# Patient Record
Sex: FEMALE | Race: WHITE | NOT HISPANIC OR LATINO | Employment: FULL TIME | URBAN - METROPOLITAN AREA
[De-identification: names, ages, dates, MRNs, and addresses within clinical notes are randomized per-mention and may not be internally consistent; named-entity substitution may affect disease eponyms.]

---

## 2017-08-10 ENCOUNTER — OFFICE VISIT (OUTPATIENT)
Dept: INTERNAL MEDICINE | Facility: MEDICAL CENTER | Age: 42
End: 2017-08-10
Payer: COMMERCIAL

## 2017-08-10 VITALS
BODY MASS INDEX: 35.82 KG/M2 | SYSTOLIC BLOOD PRESSURE: 119 MMHG | WEIGHT: 215 LBS | HEART RATE: 66 BPM | OXYGEN SATURATION: 95 % | DIASTOLIC BLOOD PRESSURE: 68 MMHG | HEIGHT: 65 IN | TEMPERATURE: 98.6 F

## 2017-08-10 DIAGNOSIS — E66.9 NON MORBID OBESITY, UNSPECIFIED OBESITY TYPE: ICD-10-CM

## 2017-08-10 DIAGNOSIS — R00.2 INTERMITTENT PALPITATIONS: ICD-10-CM

## 2017-08-10 DIAGNOSIS — F41.8 DEPRESSION WITH ANXIETY: ICD-10-CM

## 2017-08-10 DIAGNOSIS — E78.5 DYSLIPIDEMIA: ICD-10-CM

## 2017-08-10 DIAGNOSIS — R73.01 IMPAIRED FASTING GLUCOSE: ICD-10-CM

## 2017-08-10 DIAGNOSIS — E03.9 HYPOTHYROIDISM, UNSPECIFIED TYPE: ICD-10-CM

## 2017-08-10 PROCEDURE — 99214 OFFICE O/P EST MOD 30 MIN: CPT | Performed by: INTERNAL MEDICINE

## 2017-08-10 RX ORDER — LEVOTHYROXINE SODIUM 0.05 MG/1
50 TABLET ORAL
Qty: 30 TAB | Refills: 12 | Status: SHIPPED | OUTPATIENT
Start: 2017-08-10

## 2017-08-10 ASSESSMENT — PATIENT HEALTH QUESTIONNAIRE - PHQ9: CLINICAL INTERPRETATION OF PHQ2 SCORE: 0

## 2017-08-10 ASSESSMENT — PAIN SCALES - GENERAL: PAINLEVEL: NO PAIN

## 2017-08-10 NOTE — PROGRESS NOTES
"    Established Patient    Elsi Jasmine is a 42 y.o. female who presents today with the following:    CC: Follow-up for chronic medical problems.     HPI:  Feeling a bit more tired lately, cold and having some occasional episodes of waking up sweating.  She is also finding that she is falling asleep at work.  She is finding it is harder to get out of bed in the morning lately, due to fatigue upon waking.  + recent palpitations 2x in the last few months.  Recent treadmill test was negative.    1. Depression with anxiety  Mood has been stable.  She had one panic attack in the last 6 months (last week) and was able to control it without needing her lorazepam.     2. Dyslipidemia  Currently taking medications for dyslipidemia. Her most recent triglyceride level was 262, and HDL is 30. Fortunately, she has a low LDL at 74.    3. Non morbid obesity, unspecified obesity type  Weight has been stable.  Exercise is limited. She generally has a decent on a fatigue that gets in the way.    ROS:     Denies any new chest pain or shortness of breath.  No changes to urinary or bowel function. See HPI.    Past Medical History   Diagnosis Date   • Depression    • Anxiety    • Obesity (BMI 30.0-34.9)    • Hypothyroidism        Social History   Substance Use Topics   • Smoking status: Former Smoker     Quit date: 09/12/2013   • Smokeless tobacco: Never Used   • Alcohol Use: No       Current Outpatient Prescriptions   Medication Sig Dispense Refill   • paroxetine (PAXIL) 20 MG Tab Take 1 Tab by mouth every day. 30 Tab 12   • lorazepam (ATIVAN) 1 MG Tab Take 1 mg by mouth every four hours as needed for Anxiety.       No current facility-administered medications for this visit.       Physical Exam:  /68 mmHg  Pulse 66  Temp(Src) 37 °C (98.6 °F)  Ht 1.651 m (5' 5\")  Wt 97.523 kg (215 lb)  BMI 35.78 kg/m2  SpO2 95%  LMP 07/10/2017  Breastfeeding? No  General: Well developed, well nourished female, in no distress.  Eyes: " Conjuntiva without any obvious injection or erythema.   Cardiovascular: Heart is regular with no murmurs  Lungs: Clear to auscultation bilaterally. No wheezes, rhonci or crackles heard. Respiratory effort is normal.  Abd: Soft, non-tender  Ext: No edema  Neck: thyroid is non-palpable.    Assessment and Plan:  1. Depression with anxiety  Currently this is stable and well controlled.  The patient should continue current therapy with no changes at this time.   Very limited and rare use of lorazepam as needed just for panic attacks.    2. Dyslipidemia  We'll continue to monitor this. No indication for medication at this time. I'm hopeful that with treating her hypothyroidism, she'll be also get more exercise, lose weight, and have a positive impact on her lipid profile.    3. Non morbid obesity, unspecified obesity type  We'll continue to monitor weight. This could be somewhat related to low energy associated with hypothyroidism.  - Patient identified as having weight management issue.  Appropriate orders and counseling given.    4. Impaired fasting glucose  Most recent fasting blood sugar is 107. Hemoglobin A1c 5.2%. As above, weight loss will be important in controlling this.    5. Intermittent palpitations  EKG done in the office today showed normal sinus rhythm. No ischemic changes were noted. Patient has a cardiologist that she follows with. She was provided a copy of her EKG to take her cardiologist.  - EKG; Future    6. Hypothyroidism, unspecified type  Diagnosis of hypothyroidism today. TSH was noted to be elevated at 5.129, with a free T4 of 0.67. These values are consistent with what she's had over the last couple of years. Given this, along with the significant symptoms she is having, I think it's time to initiate therapy with levothyroxine. We'll start with a low-dose and recheck labs in 6 weeks.  - levothyroxine (SYNTHROID) 50 MCG Tab; Take 1 Tab by mouth Every morning on an empty stomach.  Dispense: 30  Tab; Refill: 12  - TSH; Future  - FREE THYROXINE; Future       Signed by: Perez Puckett M.D.    This note was created using voice recognition software.  While every attempt is made to ensure accuracy of transcription, occasionally errors occur.

## 2017-08-10 NOTE — MR AVS SNAPSHOT
"        Elsi Jasmine   8/10/2017 8:00 AM   Office Visit   MRN: [id number]    Department:  [facility]   Dept Phone:  [phone]    Description:  Female : 1975   Provider:  Perez Puckett M.D.           Reason for Visit     Follow-Up labs       Allergies as of 8/10/2017     No Known Allergies      You were diagnosed with     Depression with anxiety   [239448]       Dyslipidemia   [925044]       Non morbid obesity, unspecified obesity type   [3922600]       Impaired fasting glucose   [790.21.ICD-9-CM]       Intermittent palpitations   [8916929]       Hypothyroidism, unspecified type   [7527086]         Vital Signs     Blood Pressure Pulse Temperature Height Weight Body Mass Index    119/68 mmHg 66 37 °C (98.6 °F) 1.651 m (5' 5\") 97.523 kg (215 lb) 35.78 kg/m2    Oxygen Saturation Last Menstrual Period Breastfeeding? Smoking Status          95% 07/10/2017 No Former Smoker        Basic Information     Date Of Birth Sex Race Ethnicity Preferred Language    1975 Female White Non- English      Problem List              ICD-10-CM Priority Class Noted - Resolved    Non morbid obesity E66.9   2016 - Present    Depression with anxiety F41.8   2016 - Present    Dyslipidemia E78.5   2016 - Present    Impaired fasting glucose R73.01   8/10/2017 - Present    Hypothyroid E03.9   8/10/2017 - Present      Health Maintenance        Date Due Completion Dates    IMM DTaP/Tdap/Td Vaccine (6 - Tdap) 3/2/1987 1981, 1977, 1975, 1975, 1975    PAP SMEAR 3/2/1997 ---    IMM INFLUENZA (1) 2017    MAMMOGRAM 2017 (Postponed)    Override on 2016: Postponed (Pt due to see gyn soon and will have this done through them)            Current Immunizations     Dtap Vaccine 1981, 1977, 1975, 1975, 1975    Influenza Vaccine Quad Inj (Preserved) 2016    MMR Vaccine 1997, 1991    OPV - Historical Data 1981, " 1976, 1976, 1976      Below and/or attached are the medications your provider expects you to take. Review all of your home medications and newly ordered medications with your provider and/or pharmacist. Follow medication instructions as directed by your provider and/or pharmacist. Please keep your medication list with you and share with your provider. Update the information when medications are discontinued, doses are changed, or new medications (including over-the-counter products) are added; and carry medication information at all times in the event of emergency situations     Allergies:  No Known Allergies          Medications  Valid as of: August 10, 2017 - 10:13 AM    Generic Name Brand Name Tablet Size Instructions for use    Levothyroxine Sodium (Tab) SYNTHROID 50 MCG Take 1 Tab by mouth Every morning on an empty stomach.        LORazepam (Tab) ATIVAN 1 MG Take 1 mg by mouth every four hours as needed for Anxiety.        PARoxetine HCl (Tab) PAXIL 20 MG Take 1 Tab by mouth every day.        .                 Medicines prescribed today were sent to:     [address] [address]    Phone: [phone] Fax: [phone]    Open 24 Hours?: No      Medication refill instructions:       If your prescription bottle indicates you have medication refills left, it is not necessary to call your provider’s office. Please contact your pharmacy and they will refill your medication.    If your prescription bottle indicates you do not have any refills left, you may request refills at any time through one of the following ways: The online [employer] system (except Urgent Care), by calling your provider’s office, or by asking your pharmacy to contact your provider’s office with a refill request. Medication refills are processed only during regular business hours and may not be available until the next business day. Your provider may request  additional information or to have a follow-up visit with you prior to refilling your medication.   *Please Note: Medication refills are assigned a new Rx number when refilled electronically. Your pharmacy may indicate that no refills were authorized even though a new prescription for the same medication is available at the pharmacy. Please request the medicine by name with the pharmacy before contacting your provider for a refill.        Your To Do List     Future Labs/Procedures Complete By Expires    FREE THYROXINE  9/21/2017 (Approximate) 8/10/2018    TSH  9/21/2017 (Approximate) 8/11/2018    EKG  As directed 8/10/2018      Instructions    Start the new thyroid medication.  Check labs in 6 weeks.      Levothyroxine tablets  What is this medicine?  LEVOTHYROXINE (kaci voe thye AMBERLY een) is a thyroid hormone. This medicine can improve symptoms of thyroid deficiency such as slow speech, lack of energy, weight gain, hair loss, dry skin, and feeling cold. It also helps to treat goiter (an enlarged thyroid gland). It is also used to treat some kinds of thyroid cancer along with surgery and other medicines.  This medicine may be used for other purposes; ask your health care provider or pharmacist if you have questions.  COMMON BRAND NAME(S): Estre , Levo-T, Levothroid, Levoxyl, Synthroid, Thyro-Tabs, Unithroid  What should I tell my health care provider before I take this medicine?  They need to know if you have any of these conditions:  -angina  -blood clotting problems  -diabetes  -dieting or on a weight loss program  -fertility problems  -heart disease  -high levels of thyroid hormone  -pituitary gland problem  -previous heart attack  -an unusual or allergic reaction to levothyroxine, thyroid hormones, other medicines, foods, dyes, or preservatives  -pregnant or trying to get pregnant  -breast-feeding  How should I use this medicine?  Take this medicine by mouth with plenty of water. It is best to take on an empty  stomach, at least 30 minutes before or 2 hours after food. Follow the directions on the prescription label. Take at the same time each day. Do not take your medicine more often than directed.  Contact your pediatrician regarding the use of this medicine in children. While this drug may be prescribed for children and infants as young as a few days of age for selected conditions, precautions do apply. For infants, you may crush the tablet and place in a small amount of (5-10 ml or 1 to 2 teaspoonfuls) of water, breast milk, or non-soy based infant formula. Do not mix with soy-based infant formula. Give as directed.  Overdosage: If you think you have taken too much of this medicine contact a poison control center or emergency room at once.  NOTE: This medicine is only for you. Do not share this medicine with others.  What if I miss a dose?  If you miss a dose, take it as soon as you can. If it is almost time for your next dose, take only that dose. Do not take double or extra doses.  What may interact with this medicine?  -amiodarone  -antacids  -anti-thyroid medicines  -calcium supplements  -carbamazepine  -cholestyramine  -colestipol  -digoxin  -female hormones, including contraceptive or birth control pills  -iron supplements  -ketamine  -liquid nutrition products like Ensure  -medicines for colds and breathing difficulties  -medicines for diabetes  -medicines for mental depression  -medicines or herbals used to decrease weight or appetite  -phenobarbital or other barbiturate medications  -phenytoin  -prednisone or other corticosteroids  -rifabutin  -rifampin  -soy isoflavones  -sucralfate  -theophylline  -warfarin  This list may not describe all possible interactions. Give your health care provider a list of all the medicines, herbs, non-prescription drugs, or dietary supplements you use. Also tell them if you smoke, drink alcohol, or use illegal drugs. Some items may interact with your medicine.  What should I  watch for while using this medicine?  Be sure to take this medicine with plenty of fluids. Some tablets may cause choking, gagging, or difficulty swallowing from the tablet getting stuck in your throat. Most of these problems disappear if the medicine is taken with the right amount of water or other fluids.  Do not switch brands of this medicine unless your health care professional agrees with the change. Ask questions if you are uncertain.  You will need regular exams and occasional blood tests to check the response to treatment. If you are receiving this medicine for an underactive thyroid, it may be several weeks before you notice an improvement. Check with your doctor or health care professional if your symptoms do not improve.  It may be necessary for you to take this medicine for the rest of your life. Do not stop using this medicine unless your doctor or health care professional advises you to.  This medicine can affect blood sugar levels. If you have diabetes, check your blood sugar as directed.  You may lose some of your hair when you first start treatment. With time, this usually corrects itself.  If you are going to have surgery, tell your doctor or health care professional that you are taking this medicine.  What side effects may I notice from receiving this medicine?  Side effects that you should report to your doctor or health care professional as soon as possible:  -allergic reactions like skin rash, itching or hives, swelling of the face, lips, or tongue  -chest pain  -excessive sweating or intolerance to heat  -fast or irregular heartbeat  -nervousness  -skin rash or hives  -swelling of ankles, feet, or legs  -tremors  Side effects that usually do not require medical attention (report to your doctor or health care professional if they continue or are bothersome):  -changes in appetite  -changes in menstrual periods  -diarrhea  -hair loss  -headache  -trouble sleeping  -weight loss  This list may not  describe all possible side effects. Call your doctor for medical advice about side effects. You may report side effects to FDA at 0-544-ENP-4251.  Where should I keep my medicine?  Keep out of the reach of children.  Store at room temperature between 15 and 30 degrees C (59 and 86 degrees F). Protect from light and moisture. Keep container tightly closed. Throw away any unused medicine after the expiration date.  NOTE: This sheet is a summary. It may not cover all possible information. If you have questions about this medicine, talk to your doctor, pharmacist, or health care provider.  © 2014, [employer]. (3/26/2010 2:28:07 PM)            [employer] Access Code: Activation code not generated  Current [employer] Status: Active

## 2017-08-11 ENCOUNTER — TELEPHONE (OUTPATIENT)
Dept: INTERNAL MEDICINE | Facility: MEDICAL CENTER | Age: 42
End: 2017-08-11

## 2017-08-11 DIAGNOSIS — R00.2 INTERMITTENT PALPITATIONS: ICD-10-CM

## 2017-08-11 DIAGNOSIS — E03.9 HYPOTHYROIDISM, UNSPECIFIED TYPE: ICD-10-CM

## 2017-08-11 NOTE — TELEPHONE ENCOUNTER
Spoke to patient,   She has regular periods monthly for about 5 days.   No change in menstrual period in the past few years.    Checked with patient per Dr. Puckett

## 2017-09-20 ENCOUNTER — RX ONLY (OUTPATIENT)
Age: 42
Setting detail: RX ONLY
End: 2017-09-20

## 2017-09-20 PROBLEM — D22.5 MELANOCYTIC NEVI OF TRUNK: Status: ACTIVE | Noted: 2017-09-20

## 2017-10-04 PROBLEM — D49.2 NEOPLASM OF UNSPECIFIED BEHAVIOR OF BONE, SOFT TISSUE, AND SKIN: Status: RESOLVED | Noted: 2017-09-20 | Resolved: 2017-10-04

## 2018-07-20 DIAGNOSIS — F41.8 DEPRESSION WITH ANXIETY: ICD-10-CM

## 2018-07-20 RX ORDER — PAROXETINE HYDROCHLORIDE 20 MG/1
20 TABLET, FILM COATED ORAL DAILY
Qty: 90 TAB | Refills: 0 | Status: SHIPPED | OUTPATIENT
Start: 2018-07-20 | End: 2018-10-26 | Stop reason: SDUPTHER

## 2018-07-20 NOTE — TELEPHONE ENCOUNTER
Was the patient seen in the last year in this department? Yes   Last seen on 8/10/17 by Dr. Puckett Next Appt None     Does patient have an active prescription for medications requested? No     Received Request Via: Pharmacy

## 2018-10-26 DIAGNOSIS — F41.8 DEPRESSION WITH ANXIETY: ICD-10-CM

## 2018-10-29 NOTE — TELEPHONE ENCOUNTER
PT SEEN: 8/10/17 WITH Dr. Puckett NEXT APPT None   Was the patient seen in the last year in this department? Yes     Does patient have an active prescription for medications requested? No     Received Request Via: Pharmacy

## 2018-11-02 RX ORDER — PAROXETINE HYDROCHLORIDE 20 MG/1
TABLET, FILM COATED ORAL
Qty: 90 TAB | Refills: 0 | Status: SHIPPED | OUTPATIENT
Start: 2018-11-02 | End: 2019-01-28 | Stop reason: SDUPTHER

## 2019-02-07 ENCOUNTER — OFFICE VISIT (OUTPATIENT)
Dept: INTERNAL MEDICINE | Facility: MEDICAL CENTER | Age: 44
End: 2019-02-07
Payer: COMMERCIAL

## 2019-02-07 VITALS
DIASTOLIC BLOOD PRESSURE: 67 MMHG | HEIGHT: 65 IN | BODY MASS INDEX: 36.15 KG/M2 | OXYGEN SATURATION: 95 % | HEART RATE: 61 BPM | WEIGHT: 217 LBS | TEMPERATURE: 97.9 F | SYSTOLIC BLOOD PRESSURE: 115 MMHG

## 2019-02-07 DIAGNOSIS — E03.9 HYPOTHYROIDISM, UNSPECIFIED TYPE: ICD-10-CM

## 2019-02-07 DIAGNOSIS — E78.5 DYSLIPIDEMIA: ICD-10-CM

## 2019-02-07 DIAGNOSIS — Z78.9 HEPATITIS VACCINATION STATUS UNKNOWN: ICD-10-CM

## 2019-02-07 DIAGNOSIS — F41.8 DEPRESSION WITH ANXIETY: ICD-10-CM

## 2019-02-07 DIAGNOSIS — R73.01 IMPAIRED FASTING GLUCOSE: ICD-10-CM

## 2019-02-07 DIAGNOSIS — E66.9 NON MORBID OBESITY: ICD-10-CM

## 2019-02-07 PROCEDURE — 99214 OFFICE O/P EST MOD 30 MIN: CPT | Performed by: INTERNAL MEDICINE

## 2019-02-07 ASSESSMENT — PAIN SCALES - GENERAL: PAINLEVEL: NO PAIN

## 2019-02-07 ASSESSMENT — PATIENT HEALTH QUESTIONNAIRE - PHQ9: CLINICAL INTERPRETATION OF PHQ2 SCORE: 0

## 2019-02-08 NOTE — PROGRESS NOTES
Established Patient    Patient Care Team:  Perez Puckett M.D. as PCP - General (Internal Medicine)    Elsi Jasmine is a 43 y.o. female who presents today with the following Chief Complaint(s): Follow up for chronic medical problems including depression and anxiety, dyslipidemia, possible hypothyroidism.    HPI:  1. Depression with anxiety  She is doing very well on her Paxil 20 mg.  She is doing so well in fact that she has not needed any doses lorazepam for as long as she can remember.    2. Dyslipidemia  She has history of dyslipidemia, but does not have any recent lab work associated with this.    3. Hypothyroidism, unspecified type  At her last visit, it was noted she had a TSH of greater than 5, and was having some significant symptoms including weight gain.  I prescribed her levothyroxine 50 mcg daily however she did not start this.  She did gain some weight back, but ultimately lost it and is now back to the same weight she was the last time I saw her.    4. Impaired fasting glucose  Again, no recent labs.  She has been working hard on her diet and exercise regimen, and is recently lost about 15 pounds.    5. Non morbid obesity  As above, recently lost 15 pounds through diet and exercise.    6. 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  She is interested in possibly getting these done.    ROS:     Denies any new chest pain or shortness of breath.  No changes to urinary or bowel function.  See HPI.    Past Medical History:   Diagnosis Date   • Anxiety    • Depression    • Hypothyroidism    • Obesity (BMI 30.0-34.9)      Social History   Substance Use Topics   • Smoking status: Former Smoker     Quit date: 9/12/2013   • Smokeless tobacco: Never Used   • Alcohol use No     Current Outpatient Prescriptions   Medication Sig Dispense Refill   • PARoxetine (PAXIL) 20 MG Tab TAKE 1 TABLET BY MOUTH EVERY DAY 90  "Tab 1   • levothyroxine (SYNTHROID) 50 MCG Tab Take 1 Tab by mouth Every morning on an empty stomach. (Patient not taking: Reported on 2/7/2019) 30 Tab 12   • lorazepam (ATIVAN) 1 MG Tab Take 1 mg by mouth every four hours as needed for Anxiety.       No current facility-administered medications for this visit.      Physical Exam:  /67 (BP Location: Right arm, Patient Position: Sitting)   Pulse 61   Temp 36.6 °C (97.9 °F) (Temporal)   Ht 1.651 m (5' 5\")   Wt 98.4 kg (217 lb)   SpO2 95%   BMI 36.11 kg/m²   General: Well developed, well nourished female, in no distress.  Eyes: Conjuntiva without any obvious injection or erythema.   Cardiovascular: Heart is regular with no murmur  Lungs: Clear to auscultation bilaterally. No wheezes, rhonci or crackles heard. Respiratory effort is normal.  Abd: Soft, non-tender  Ext: No edema    Assessment and Plan:   1. Depression with anxiety  Overall doing well on Paxil 20 mg daily.  Continue this for now.    2. Dyslipidemia  Like to see her back in 6 months, with a updated metabolic panel lipid profile.  - Comp Metabolic Panel; Future  - Lipid Profile; Future    3. Hypothyroidism, unspecified type  For now she seems to be doing well even though she is not on any levothyroxine.  I had like to recheck these numbers before proceeding.  - TSH; Future  - FREE THYROXINE; Future    4. Impaired fasting glucose  We will plan to recheck her metabolic panel and hemoglobin A1c however I also encouraged her to continue diet and exercise plan.  - Comp Metabolic Panel; Future  - HEMOGLOBIN A1C; Future    5. Non morbid obesity  Diet and exercise will be the mainstay of therapy for this.  She is doing excellent with having lost 15 pounds recently.    6. 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  I will plan to check her hepatitis panel before proceeding with a hepatitis a and B " vaccine series.  - HEPATITIS A AB IGM+TOTAL  - HEP B SURFACE AB  - HEP B CORE AB TOTAL    Return in about 6 months (around 8/7/2019).  Patient Instructions   Get labs done  Keep up with the diet and exercise.  Back to see me in 6 months.      Perez Puckett M.D.   of Internal Medicine  [facility]    This note was created using voice recognition software.  While every attempt is made to ensure accuracy of transcription, occasionally errors occur.

## 2019-06-17 ENCOUNTER — OFFICE VISIT (OUTPATIENT)
Dept: INTERNAL MEDICINE | Facility: MEDICAL CENTER | Age: 44
End: 2019-06-17
Payer: COMMERCIAL

## 2019-06-17 VITALS
DIASTOLIC BLOOD PRESSURE: 70 MMHG | OXYGEN SATURATION: 95 % | TEMPERATURE: 98 F | HEIGHT: 65 IN | BODY MASS INDEX: 36.11 KG/M2 | SYSTOLIC BLOOD PRESSURE: 123 MMHG | HEART RATE: 76 BPM

## 2019-06-17 DIAGNOSIS — F41.8 DEPRESSION WITH ANXIETY: ICD-10-CM

## 2019-06-17 DIAGNOSIS — F43.0 PANIC ATTACK AS REACTION TO STRESS: ICD-10-CM

## 2019-06-17 DIAGNOSIS — F40.9 PHOBIC ANXIETY DISORDER, UNSPECIFIED: ICD-10-CM

## 2019-06-17 DIAGNOSIS — F41.0 PANIC ATTACK AS REACTION TO STRESS: ICD-10-CM

## 2019-06-17 PROCEDURE — 99214 OFFICE O/P EST MOD 30 MIN: CPT | Performed by: INTERNAL MEDICINE

## 2019-06-17 RX ORDER — AMOXICILLIN 875 MG/1
875 TABLET, COATED ORAL
Refills: 0 | COMMUNITY
Start: 2019-06-04 | End: 2023-02-20

## 2019-06-17 RX ORDER — LORAZEPAM 1 MG/1
1 TABLET ORAL EVERY 8 HOURS PRN
Qty: 10 TAB | Refills: 0 | Status: SHIPPED | OUTPATIENT
Start: 2019-06-17 | End: 2019-09-15

## 2019-06-17 ASSESSMENT — PAIN SCALES - GENERAL: PAINLEVEL: NO PAIN

## 2019-06-17 NOTE — PATIENT INSTRUCTIONS
Do the deep breathing exercises when you feel onset of panic.  Okay to use the ativan as needed for last resort.  Remember, there is a ship's doctor in case you need help while on your cruise.

## 2019-06-17 NOTE — PROGRESS NOTES
Established Patient    Patient Care Team:  Perez Puckett M.D. as PCP - General (Internal Medicine)    Elsi Jasmine is a 44 y.o. female who presents today with the following Chief Complaint(s): Acute visit for panic attacks     HPI:  1. Panic attack as reaction to stress  2. Phobic anxiety disorder, unspecified  3. Depression with anxiety    She has been having some recent stress in her life that has been leading to some increased panic attacks.  The most significant issue is that her daughter is graduated from high school, and as a graduation present to her when they are going [place]  She has a significant phobia associated with open water/deep water, and in particular with sharks.  Overall this is causing her some increased stress and anxiety as she thinks about the upcoming trip.  This is been compounded by the fact that her daughter is having some passport issues that could end up impacting the trip.  Additionally, there is some financial stress compounding as well.    In the lead up to this, she has had 2 panic attacks now.  In the past she has had Ativan 1 mg tablets on hand as needed for panic attacks, but has not needed this in multiple years now.  With these most recent panic attacks, she did not have any Ativan on hand, and was able to ultimately control both of her panic attacks with some deep breathing, and with talking to her friends about what she was feeling.  This was actually helpful for her to understand that she could control these without medications, but she is still concerned about being on a [place] the [place] and not have the ability to control anxiety as needed.    I reviewed her prescription monitoring program report, and no data was returned, confirming the fact that she has not had a prescription for Ativan in multiple years.    ROS:     + for panic attacks/anxiety, + palpitations.    Past Medical History:  "  Diagnosis Date   • Anxiety    • Depression    • Hypothyroidism    • Obesity (BMI 30.0-34.9)      Social History   Substance Use Topics   • Smoking status: Former Smoker     Quit date: 9/12/2013   • Smokeless tobacco: Never Used   • Alcohol use No     Current Outpatient Prescriptions   Medication Sig Dispense Refill   • amoxicillin (AMOXIL) 875 MG tablet Take 875 mg by mouth. FOR 14 DAYS  0   • PARoxetine (PAXIL) 20 MG Tab TAKE 1 TABLET BY MOUTH EVERY DAY 90 Tab 1   • lorazepam (ATIVAN) 1 MG Tab Take 1 mg by mouth every four hours as needed for Anxiety.     • levothyroxine (SYNTHROID) 50 MCG Tab Take 1 Tab by mouth Every morning on an empty stomach. (Patient not taking: Reported on 6/17/2019) 30 Tab 12     No current facility-administered medications for this visit.      Physical Exam:  /70 (BP Location: Left arm, Patient Position: Sitting)   Pulse 76   Temp 36.7 °C (98 °F) (Temporal)   Ht 1.651 m (5' 5\")   SpO2 95%   BMI 36.11 kg/m²   General: Well developed, well nourished female, in no distress.  Eyes: Conjuntiva without any obvious injection or erythema.   Cardiovascular: Heart is regular with no murmur  Lungs: Clear to auscultation bilaterally. No wheezes, rhonci or crackles heard. Respiratory effort is normal.  Abd: Soft, non-tender  Ext: No edema  Psych: normal affect.    IRIS-7 = 4 (pt reported a 2 on feeling nervous, anxious or on edge, and a 2 on worrying too much about different things)    Assessment and Plan:    1. Panic attack as reaction to stress  2. Phobic anxiety disorder, unspecified  I think is very reasonable for her to have a small supply of Ativan on hand in anticipation of her upcoming cruise.  She is done very well with this in the past.  I did however recommend that when she has the onset of symptoms, she should first try deep breathing and other relaxation exercises that have worked in the past for her.  She should only use the Lorazepam as a last resort.  I also reminded her " that while she is on the cruise, there will be a ship's doctor who can help manage any new symptoms that might come up.    As above, I have reviewed her prescription monitoring program report, and no instances were noted of prescription fills for opiates or other controlled substances.    - LORazepam (ATIVAN) 1 MG Tab; Take 1 Tab by mouth every 8 hours as needed for Anxiety for up to 90 days.  Dispense: 10 Tab; Refill: 0    3. Depression with anxiety  Overall she is doing well with regard to depression with anxiety, and her current episodes of panic attack and increased anxiety seems to be very situational in nature.  That said, I think it would be reasonable for her to consider an increased dose of Paxil in the future if she notes that she is having increasing frequency of panic attacks, or increased overall generalized anxiety.    Return for visit as scheduled.    Patient Instructions   Do the deep breathing exercises when you feel onset of panic.  Okay to use the ativan as needed for last resort.  Remember, there is a ship's doctor in case you need help while on your cruise.      Perez Puckett M.D.   of Internal Medicine  [facility]    This note was created using voice recognition software.  While every attempt is made to ensure accuracy of transcription, occasionally errors occur.

## 2020-03-25 ENCOUNTER — HOSPITAL ENCOUNTER (OUTPATIENT)
Dept: LAB | Facility: MEDICAL CENTER | Age: 45
End: 2020-03-25
Attending: PHYSICIAN ASSISTANT
Payer: COMMERCIAL

## 2020-03-25 LAB
BASOPHILS # BLD AUTO: 0.8 % (ref 0–1.8)
BASOPHILS # BLD: 0.05 K/UL (ref 0–0.12)
EOSINOPHIL # BLD AUTO: 0.06 K/UL (ref 0–0.51)
EOSINOPHIL NFR BLD: 0.9 % (ref 0–6.9)
ERYTHROCYTE [DISTWIDTH] IN BLOOD BY AUTOMATED COUNT: 42.8 FL (ref 35.9–50)
FSH SERPL-ACNC: 7.2 MIU/ML
HCT VFR BLD AUTO: 39.5 % (ref 37–47)
HGB BLD-MCNC: 13.7 G/DL (ref 12–16)
IMM GRANULOCYTES # BLD AUTO: 0.01 K/UL (ref 0–0.11)
IMM GRANULOCYTES NFR BLD AUTO: 0.2 % (ref 0–0.9)
LYMPHOCYTES # BLD AUTO: 2.9 K/UL (ref 1–4.8)
LYMPHOCYTES NFR BLD: 44.5 % (ref 22–41)
MCH RBC QN AUTO: 32.6 PG (ref 27–33)
MCHC RBC AUTO-ENTMCNC: 34.7 G/DL (ref 33.6–35)
MCV RBC AUTO: 94 FL (ref 81.4–97.8)
MONOCYTES # BLD AUTO: 0.58 K/UL (ref 0–0.85)
MONOCYTES NFR BLD AUTO: 8.9 % (ref 0–13.4)
NEUTROPHILS # BLD AUTO: 2.92 K/UL (ref 2–7.15)
NEUTROPHILS NFR BLD: 44.7 % (ref 44–72)
NRBC # BLD AUTO: 0 K/UL
NRBC BLD-RTO: 0 /100 WBC
PLATELET # BLD AUTO: 355 K/UL (ref 164–446)
PMV BLD AUTO: 9.3 FL (ref 9–12.9)
RBC # BLD AUTO: 4.2 M/UL (ref 4.2–5.4)
T4 FREE SERPL-MCNC: 0.72 NG/DL (ref 0.53–1.43)
TSH SERPL DL<=0.005 MIU/L-ACNC: 7.19 UIU/ML (ref 0.38–5.33)
WBC # BLD AUTO: 6.5 K/UL (ref 4.8–10.8)

## 2020-03-25 PROCEDURE — 84439 ASSAY OF FREE THYROXINE: CPT

## 2020-03-25 PROCEDURE — 83001 ASSAY OF GONADOTROPIN (FSH): CPT

## 2020-03-25 PROCEDURE — 88175 CYTOPATH C/V AUTO FLUID REDO: CPT

## 2020-03-25 PROCEDURE — 87624 HPV HI-RISK TYP POOLED RSLT: CPT

## 2020-03-25 PROCEDURE — 36415 COLL VENOUS BLD VENIPUNCTURE: CPT

## 2020-03-25 PROCEDURE — 85025 COMPLETE CBC W/AUTO DIFF WBC: CPT

## 2020-03-25 PROCEDURE — 84443 ASSAY THYROID STIM HORMONE: CPT

## 2020-03-27 LAB
CYTOLOGY REG CYTOL: NORMAL
HPV HR 12 DNA CVX QL NAA+PROBE: NEGATIVE
HPV16 DNA SPEC QL NAA+PROBE: NEGATIVE
HPV18 DNA SPEC QL NAA+PROBE: NEGATIVE
SPECIMEN SOURCE: NORMAL

## 2021-02-24 NOTE — PATIENT INSTRUCTIONS
Start the new thyroid medication.  Check labs in 6 weeks.      Levothyroxine tablets  What is this medicine?  LEVOTHYROXINE (kaci olivo AMBERLY een) is a thyroid hormone. This medicine can improve symptoms of thyroid deficiency such as slow speech, lack of energy, weight gain, hair loss, dry skin, and feeling cold. It also helps to treat goiter (an enlarged thyroid gland). It is also used to treat some kinds of thyroid cancer along with surgery and other medicines.  This medicine may be used for other purposes; ask your health care provider or pharmacist if you have questions.  COMMON BRAND NAME(S): Estre , Levo-T, Levothroid, Levoxyl, Synthroid, Thyro-Tabs, Unithroid  What should I tell my health care provider before I take this medicine?  They need to know if you have any of these conditions:  -angina  -blood clotting problems  -diabetes  -dieting or on a weight loss program  -fertility problems  -heart disease  -high levels of thyroid hormone  -pituitary gland problem  -previous heart attack  -an unusual or allergic reaction to levothyroxine, thyroid hormones, other medicines, foods, dyes, or preservatives  -pregnant or trying to get pregnant  -breast-feeding  How should I use this medicine?  Take this medicine by mouth with plenty of water. It is best to take on an empty stomach, at least 30 minutes before or 2 hours after food. Follow the directions on the prescription label. Take at the same time each day. Do not take your medicine more often than directed.  Contact your pediatrician regarding the use of this medicine in children. While this drug may be prescribed for children and infants as young as a few days of age for selected conditions, precautions do apply. For infants, you may crush the tablet and place in a small amount of (5-10 ml or 1 to 2 teaspoonfuls) of water, breast milk, or non-soy based infant formula. Do not mix with soy-based infant formula. Give as directed.  Overdosage: If you think you have  Seizure taken too much of this medicine contact a poison control center or emergency room at once.  NOTE: This medicine is only for you. Do not share this medicine with others.  What if I miss a dose?  If you miss a dose, take it as soon as you can. If it is almost time for your next dose, take only that dose. Do not take double or extra doses.  What may interact with this medicine?  -amiodarone  -antacids  -anti-thyroid medicines  -calcium supplements  -carbamazepine  -cholestyramine  -colestipol  -digoxin  -female hormones, including contraceptive or birth control pills  -iron supplements  -ketamine  -liquid nutrition products like Ensure  -medicines for colds and breathing difficulties  -medicines for diabetes  -medicines for mental depression  -medicines or herbals used to decrease weight or appetite  -phenobarbital or other barbiturate medications  -phenytoin  -prednisone or other corticosteroids  -rifabutin  -rifampin  -soy isoflavones  -sucralfate  -theophylline  -warfarin  This list may not describe all possible interactions. Give your health care provider a list of all the medicines, herbs, non-prescription drugs, or dietary supplements you use. Also tell them if you smoke, drink alcohol, or use illegal drugs. Some items may interact with your medicine.  What should I watch for while using this medicine?  Be sure to take this medicine with plenty of fluids. Some tablets may cause choking, gagging, or difficulty swallowing from the tablet getting stuck in your throat. Most of these problems disappear if the medicine is taken with the right amount of water or other fluids.  Do not switch brands of this medicine unless your health care professional agrees with the change. Ask questions if you are uncertain.  You will need regular exams and occasional blood tests to check the response to treatment. If you are receiving this medicine for an underactive thyroid, it may be several weeks before you notice an improvement.  Check with your doctor or health care professional if your symptoms do not improve.  It may be necessary for you to take this medicine for the rest of your life. Do not stop using this medicine unless your doctor or health care professional advises you to.  This medicine can affect blood sugar levels. If you have diabetes, check your blood sugar as directed.  You may lose some of your hair when you first start treatment. With time, this usually corrects itself.  If you are going to have surgery, tell your doctor or health care professional that you are taking this medicine.  What side effects may I notice from receiving this medicine?  Side effects that you should report to your doctor or health care professional as soon as possible:  -allergic reactions like skin rash, itching or hives, swelling of the face, lips, or tongue  -chest pain  -excessive sweating or intolerance to heat  -fast or irregular heartbeat  -nervousness  -skin rash or hives  -swelling of ankles, feet, or legs  -tremors  Side effects that usually do not require medical attention (report to your doctor or health care professional if they continue or are bothersome):  -changes in appetite  -changes in menstrual periods  -diarrhea  -hair loss  -headache  -trouble sleeping  -weight loss  This list may not describe all possible side effects. Call your doctor for medical advice about side effects. You may report side effects to FDA at 6-847-FDA-2271.  Where should I keep my medicine?  Keep out of the reach of children.  Store at room temperature between 15 and 30 degrees C (59 and 86 degrees F). Protect from light and moisture. Keep container tightly closed. Throw away any unused medicine after the expiration date.  NOTE: This sheet is a summary. It may not cover all possible information. If you have questions about this medicine, talk to your doctor, pharmacist, or health care provider.  © 2014, [employer]. (3/26/2010 2:28:07 PM)

## 2023-02-20 ENCOUNTER — OFFICE VISIT (OUTPATIENT)
Dept: URGENT CARE | Facility: PHYSICIAN GROUP | Age: 48
End: 2023-02-20
Payer: COMMERCIAL

## 2023-02-20 VITALS
TEMPERATURE: 97.3 F | SYSTOLIC BLOOD PRESSURE: 126 MMHG | BODY MASS INDEX: 35.52 KG/M2 | OXYGEN SATURATION: 95 % | DIASTOLIC BLOOD PRESSURE: 64 MMHG | WEIGHT: 221 LBS | HEIGHT: 66 IN | HEART RATE: 79 BPM | RESPIRATION RATE: 18 BRPM

## 2023-02-20 DIAGNOSIS — H92.01 ACUTE OTALGIA, RIGHT: ICD-10-CM

## 2023-02-20 DIAGNOSIS — H61.21 IMPACTED CERUMEN OF RIGHT EAR: ICD-10-CM

## 2023-02-20 PROCEDURE — 69210 REMOVE IMPACTED EAR WAX UNI: CPT | Performed by: NURSE PRACTITIONER

## 2023-02-20 PROCEDURE — 99203 OFFICE O/P NEW LOW 30 MIN: CPT | Mod: 25 | Performed by: NURSE PRACTITIONER

## 2023-02-20 RX ORDER — LORAZEPAM 1 MG/1
TABLET ORAL
COMMUNITY
Start: 2015-08-31

## 2023-02-20 NOTE — PROGRESS NOTES
Chief Complaint   Patient presents with    Otalgia     X 1 day Rt ear pain, headache       HISTORY OF PRESENT ILLNESS: Patient is a pleasant 47 y.o. female who presents to urgent care today with complaints of right-sided ear pain for the last 2 days.  Pain is intermittent.  She did have a cold last week which notes has cleared up.  Denies any current nasal congestion, sinus pressure, fever.  She otherwise feels well.  She does wear ear buds frequently.    Patient Active Problem List    Diagnosis Date Noted    Panic attack as reaction to stress 2019    Impaired fasting glucose 08/10/2017    Hypothyroid 08/10/2017    Non morbid obesity 2016    Depression with anxiety 2016    Dyslipidemia 2016    Hypertriglyceridemia 2015    Anxiety 2014    Overweight 2014       Allergies:Patient has no known allergies.    Current Outpatient Medications Ordered in Epic   Medication Sig Dispense Refill    PARoxetine (PAXIL) 20 MG Tab TAKE 1 TABLET BY MOUTH EVERY DAY 90 Tab 1    LORazepam (ATIVAN) 1 MG Tab ATIVAN 1 MG TABS (Patient not taking: Reported on 2023)      amoxicillin (AMOXIL) 875 MG tablet Take 875 mg by mouth. FOR 14 DAYS (Patient not taking: Reported on 2023)  0    levothyroxine (SYNTHROID) 50 MCG Tab Take 1 Tab by mouth Every morning on an empty stomach. (Patient not taking: Reported on 2019) 30 Tab 12     No current [facility]-ordered facility-administered medications on file.       Past Medical History:   Diagnosis Date    Anxiety     Depression     Hypothyroidism     Obesity (BMI 30.0-34.9)        Social History     Tobacco Use    Smoking status: Former     Types: Cigarettes     Quit date: 2013     Years since quittin.4    Smokeless tobacco: Never   Substance Use Topics    Alcohol use: No    Drug use: No       Family Status   Relation Name Status    Mo  (Not Specified)    Fa  (Not Specified)     Family History   Problem Relation Age of Onset    Hypertension  "Mother     Other Father         AFIB/BLOOD CLOTS       ROS:  Review of Systems   Constitutional: Negative for fever, chills, weight loss, malaise, and fatigue.   HENT: Positive for ear pain, recent congestion.  Negative for nosebleeds, current congestion, sore throat and neck pain.    Eyes: Negative for vision changes.   Neuro: Negative for sensory changes, weakness, seizure, LOC.   Cardiovascular: Negative for chest pain, palpitations, orthopnea and leg swelling.   Respiratory: Negative for cough, sputum production, shortness of breath and wheezing.   Gastrointestinal: Negative for abdominal pain, nausea, vomiting or diarrhea.   Genitourinary: Negative for dysuria, urgency and frequency.  Musculoskeletal: Negative for falls, neck pain, back pain, joint pain, myalgias.   Skin: Negative for rash, diaphoresis.     Exam:  /64 (BP Location: Left arm, Patient Position: Sitting, BP Cuff Size: Adult)   Pulse 79   Temp 36.3 °C (97.3 °F) (Temporal)   Resp 18   Ht 1.676 m (5' 6\")   Wt 100 kg (221 lb)   SpO2 95%   General: well-nourished, well-developed female in NAD  Head: normocephalic, atraumatic  Eyes: PERRLA, no conjunctival injection, acuity grossly intact, lids normal.  Ears: normal shape and symmetry, no tenderness, no discharge. External canals are without any significant edema or erythema.  Left tympanic membrane is without any inflammation, no effusion.  Unable to visualize right tympanic membrane due to cerumen impaction.  Gross auditory acuity is intact.  Nose: symmetrical without tenderness, no discharge.  Mouth/Throat: reasonable hygiene, no erythema, exudates or tonsillar enlargement.  Neck: no masses, range of motion within normal limits, no tracheal deviation. No obvious thyroid enlargement.   Lymph: no cervical adenopathy. No supraclavicular adenopathy.   Neuro: alert and oriented. Cranial nerves 1-12 grossly intact. No sensory deficit.   Cardiovascular: regular rate and rhythm. No " edema.  Pulmonary: no distress. Chest is symmetrical with respiration, no wheezes, crackles, or rhonchi.   Musculoskeletal: no clubbing, appropriate muscle tone, gait is stable.  Skin: warm, dry, intact, no clubbing, no cyanosis, no rashes.   Psych: appropriate mood, affect, judgement.       Procedure: Cerumen Removal  Risks and benefits of procedure discussed  Cerumen removed with curette and lavage after softening agent instilled by MA  Patient tolerated well  Post procedure exam with clear canal and normal TM        Assessment/Plan:  1. Impacted cerumen of right ear        2. Acute otalgia, right              Patient presents with right-sided otalgia, upon examination she had right-sided ear cerumen impaction.  Cerumen removed in clinic with significant improvement of symptoms.  Instructed keep ear clean and dry.  In the future may use OTC Debrox.  Supportive care, differential diagnoses, and indications for immediate follow-up discussed with patient.   Pathogenesis of diagnosis discussed including typical length and natural progression.   Instructed to return to clinic or nearest emergency department for any change in condition, further concerns, or worsening of symptoms.  Patient states understanding of the plan of care and discharge instructions.  Instructed to make an appointment, for follow up, with her primary care provider.        Please note that this dictation was created using voice recognition software. I have made every reasonable attempt to correct obvious errors, but I expect that there are errors of grammar and possibly content that I did not discover before finalizing the note.      JESSE Tejada.